# Patient Record
(demographics unavailable — no encounter records)

---

## 2024-10-07 NOTE — PLAN
[FreeTextEntry1] : 49 year old female presents with vulvar itching  -Ucx done -BD affirm done -Rx Terconazole  -Rx Mycolog  RTO 03/25

## 2024-10-07 NOTE — HISTORY OF PRESENT ILLNESS
[FreeTextEntry1] : 49 year old female presents c/o vulvar itching for the past month with mild vaginal discharge. Denies pelvic pain. Pt has Mirena IUD placed 04/03/24.

## 2024-10-07 NOTE — END OF VISIT
[FreeTextEntry3] : I, Concha Esposito, acted as a scribe on behalf of Dr. Zulma Huang on 10/07/2024.  All medical entries made by the scribe were at my, Dr. Zulma Huang, direction and personally dictated by me on 10/07/2024. I have reviewed the chart and agree that the record accurately reflects my personal performance of the history, physical exam, assessment and plan. I have also personally directed, reviewed, and agreed with the chart.

## 2024-10-25 NOTE — PROCEDURE
[de-identified] : Euflexxa # 1 Bilateral Knee Discussed at length with the patient the planned Euflexxa injection. The risks, benefits, convalescence and alternatives were reviewed. The possible side effects discussed included but were not limited to: pain, swelling, heat and redness. There symptoms are generally mild but if they are extensive then contact the office. Giving pain relievers by mouth such as NSAIDs or Tylenol can generally treat the reactions to Euflexxa. Rare cases of infection have been noted. Rash, hives and itching may occur post injection. If you have muscle pain or cramps, flushing and or swelling of the face, rapid heart beat, nausea, dizziness, fever, chills, headache, difficulty breathing, swelling in the arms or legs, or have a prickly feeling of your skin, contact a health care provider immediately.  Following this discussion, the knee was prepped with betadine and under sterile condition the Euflexxa injection was performed with a 22 gauge needle. The needle was introduced into the joint, aspiration was performed to ensure intra-articular placement and the medication was injected. Upon withdrawal of the needle the site was cleaned with alcohol and a bandaid applied. The patient tolerated the injection well and there were no adverse effects. Post injection instructions included no strenuous activity for 24 hours, cryotherapy and if there are any adverse effects to contact the office.

## 2024-10-31 NOTE — ASSESSMENT
[FreeTextEntry1] : #Cysts, R neck, numerous milia-like cysts admixed with inflamed dermal nervus - Counseled about clinically benign lesions  #Eczematous dermatitis - chronic; recurrent flares #Dermatographism - Diagnosis, chronic nature, disease course, treatment options and goals of therapy discussed - Advised avoid necklace as can cause irritant dermatitis vs allergic contact dermatitis to the silver or plated metal - Start OTC allegra or zyrtec daily - Start Tacrolimus 0.1% ointment BID PRN. SED - Will stop topical steroid (triamcinolone, clobetasol) due to potential risks of atrophy with recurrent use on neck  #Neoplasm of uncertain behavior of skin with associated #nail dystrophy  R. 4th toenail at proximal nailfold  - Discussed etiology unspecified, given nail dystrophy with noted growths, recommended biopsy but for highest yield with concern for SCC or NMSC would recommend nail matrix biopsy - Referral to Dr Almaraz  Patient expressed understanding and in agreement with plan  RTC 6 weeks for dermatitis

## 2024-10-31 NOTE — HISTORY OF PRESENT ILLNESS
[FreeTextEntry1] : f/u rash  [de-identified] : Ms. KRISTA DU is a 49 year old F here for evaluation of below   LV w/ Dr. Whittington 7/2024 Problem: right neck rash and inflamed bumps Location: right lateral neck Duration: present for past 6-7 months  Associated symptoms/Aggravating Factors: pruritic Modifying factors/Treatments: trialed clobetasol cream with minimal improvement    Derm Hx: dermatitis as above Personal hx of skin cancer: none FHx of skin cancer: none  Social Hx: works in compliance, works in [place]

## 2024-10-31 NOTE — HISTORY OF PRESENT ILLNESS
[FreeTextEntry1] : f/u rash  [de-identified] : Ms. KRISTA DU is a 49 year old F here for evaluation of below   LV w/ Dr. Whittington 7/2024 Problem: right neck rash and inflamed bumps Location: right lateral neck Duration: present for past 6-7 months  Associated symptoms/Aggravating Factors: pruritic Modifying factors/Treatments: trialed clobetasol cream with minimal improvement    Derm Hx: dermatitis as above Personal hx of skin cancer: none FHx of skin cancer: none  Social Hx: works in compliance, works in [place]

## 2024-10-31 NOTE — PHYSICAL EXAM
[Declined] : declined [FreeTextEntry3] : multiple pink-red papules on the body firm white 1-2 mm papules on the R neck; +dermatographism on R neck brown macule on the leg  right 4th toe with 2 linear exophytic soft growths from proximal nail fold with underlying nail dystrophy

## 2024-11-01 NOTE — PROCEDURE
[de-identified] : Euflexxa # 2 Bilateral knee Discussed at length with the patient the planned Euflexxa injection. The risks, benefits, convalescence and alternatives were reviewed. The possible side effects discussed included but were not limited to: pain, swelling, heat and redness. There symptoms are generally mild but if they are extensive then contact the office. Giving pain relievers by mouth such as NSAIDs or Tylenol can generally treat the reactions to Euflexxa. Rare cases of infection have been noted. Rash, hives and itching may occur post injection. If you have muscle pain or cramps, flushing and or swelling of the face, rapid heart beat, nausea, dizziness, fever, chills, headache, difficulty breathing, swelling in the arms or legs, or have a prickly feeling of your skin, contact a health care provider immediately.  Following this discussion, the knee was prepped with betadine and under sterile condition the Euflexxa injection was performed with a 22 gauge needle. The needle was introduced into the joint, aspiration was performed to ensure intra-articular placement and the medication was injected. Upon withdrawal of the needle the site was cleaned with alcohol and a bandaid applied. The patient tolerated the injection well and there were no adverse effects. Post injection instructions included no strenuous activity for 24 hours, cryotherapy and if there are any adverse effects to contact the office.

## 2024-11-11 NOTE — PROCEDURE
[de-identified] : Euflexxa #3 Bilateral knees Discussed at length with the patient the planned Euflexxa injection. The risks, benefits, convalescence and alternatives were reviewed. The possible side effects discussed included but were not limited to: pain, swelling, heat and redness. There symptoms are generally mild but if they are extensive then contact the office. Giving pain relievers by mouth such as NSAIDs or Tylenol can generally treat the reactions to Euflexxa. Rare cases of infection have been noted. Rash, hives and itching may occur post injection. If you have muscle pain or cramps, flushing and or swelling of the face, rapid heart beat, nausea, dizziness, fever, chills, headache, difficulty breathing, swelling in the arms or legs, or have a prickly feeling of your skin, contact a health care provider immediately.  Following this discussion, the knee was prepped with betadine and under sterile condition the Euflexxa injection was performed with a 22 gauge needle. The needle was introduced into the joint, aspiration was performed to ensure intra-articular placement and the medication was injected. Upon withdrawal of the needle the site was cleaned with alcohol and a bandaid applied. The patient tolerated the injection well and there were no adverse effects. Post injection instructions included no strenuous activity for 24 hours, cryotherapy and if there are any adverse effects to contact the office.

## 2024-11-12 NOTE — HISTORY OF PRESENT ILLNESS
[FreeTextEntry1] : painful lesion on R 4th toenail [de-identified] : 11/12/2024   Referred by: Dr. Coello   We had the pleasure of seeing your patient in consultation for Mohs Micrographic Surgery. Ms. KRISTA DU is a 49 year old F who presents for painful lesion on the R 4th toenail Present since at least this summer Thought it was trauma related initially but continues to persist with a bump at proximal nail fold. More recently started throbbing about 1 week ago She regularly gets pedicures but currently avoiding it No hx of prior warts No hx of genital warts or extensive hand warts Her children had warts as kids She has a trip to [place] 11/16-11/18 weekend She is celebrating her 50th bday with a big trip in December    Pertinent positives noted below History of HIV or hepatitis: No Blood thinners: None Antibiotic Prophylaxis: None Medical implants: None   The patient's review of systems questionnaire was reviewed. Education needs were identified. There were no barriers to learning.

## 2024-11-12 NOTE — PHYSICAL EXAM
[FreeTextEntry3] : R 4th toenail - underlying 4 mm groove running on the entire length of the toenail. Overlying this,there are 2 filliform-like plaques with loops of vessels. Tender to palpation at the cuticle Rest of toenails WNL

## 2024-11-25 NOTE — HISTORY OF PRESENT ILLNESS
[FreeTextEntry1] : painful lesion on R 4th toenail [de-identified] : 11/25/2024   Referred by: Dr. Coello   We had the pleasure of seeing your patient for nail biopsy Ms. KRISTA DU is a 49 year old F who presents for painful lesion on the R 4th toenail. Consult done 11/8/2024 Present since at least this summer, maybe as far back as Feb Thought it was trauma related initially but continues to persist with a bump at proximal nail fold. More recently started throbbing about 1 week ago She regularly gets pedicures but currently avoiding it No hx of prior warts No hx of genital warts or extensive hand warts Her children had warts as kids She had a trip to [place] 11/16-11/18 weekend She is celebrating her 50th bday with a big trip in December Has psoriatic arthritis, on tremfya, sulfasalazine. some scalp irritation otherwise skin is clear. No history of epilepsy  Seen for surgery with her , Dmitry  Pertinent positives noted below History of HIV or hepatitis: No Blood thinners: None Antibiotic Prophylaxis: None Medical implants: None   The patient's review of systems questionnaire was reviewed. Education needs were identified. There were no barriers to learning.

## 2024-11-25 NOTE — PHYSICAL EXAM
[Alert] : alert [Oriented x 3] : ~L oriented x 3 [Well Nourished] : well nourished [Conjunctiva Non-injected] : conjunctiva non-injected [No Visual Lymphadenopathy] : no visual  lymphadenopathy [No Clubbing] : no clubbing [No Edema] : no edema [No Bromhidrosis] : no bromhidrosis [No Chromhidrosis] : no chromhidrosis [FreeTextEntry3] : R 4th toenail - underlying 4 mm groove running on the entire length of the toenail. Overlying this,there are 2 filliform-like plaques with loops of vessels. Tender to palpation at the cuticle Rest of toenails WNL

## 2024-12-02 NOTE — HISTORY OF PRESENT ILLNESS
[FreeTextEntry1] : Fu - wound check [de-identified] : Ms. KRISTA DU  is a 49 year  y/o F  here for  wound check after R 4th toenail avulsion/matrix bx on 11/25/24. Experiencing pain and would like a note to take this week off work.

## 2024-12-02 NOTE — ASSESSMENT
[FreeTextEntry1] : s/p toenail avulsion with biopsy -- Results pending -- Surgical site is clean and healing well -- Discussed that post-op pain is normal and should ed with time - may be worsened given her immunologic history of arthritis as well as recent history of R foot sprain -- f/u with me as needed

## 2024-12-02 NOTE — PHYSICAL EXAM
[Alert] : alert [Oriented x 3] : ~L oriented x 3 [Well Nourished] : well nourished [Conjunctiva Non-injected] : conjunctiva non-injected [No Visual Lymphadenopathy] : no visual  lymphadenopathy [No Clubbing] : no clubbing [No Edema] : no edema [No Bromhidrosis] : no bromhidrosis [No Chromhidrosis] : no chromhidrosis [FreeTextEntry3] : clean and well healing R 4th toenail bed, no expanding erythema, no active bleeding, no purulence.

## 2025-03-11 NOTE — HISTORY OF PRESENT ILLNESS
[FreeTextEntry1] : 49 yo  presents for annual exam. She is doing well and has no complaints. Pt had Mirena IUD placed     OBH: ,  @32 weeks, MAB @6 weeks D+C, c/s @37 weeks GYNH: right ovarian cyst, Mirena IUD placed  PSH: [facility] appendectomy (2021), C/S x1 FH: ovarian CA (MAunt), lung CA (mother), CVA (father), CVD (father), DM (mother) Allergies: Diflucan, Latex   [Mammogramdate] : 1/2025 [PapSmeardate] : 3/2024 [ColonoscopyDate] : 9/22

## 2025-03-11 NOTE — END OF VISIT
[FreeTextEntry3] :    I, Lydia Ventura, acted as a scribe on behalf of Dr.Susan Reina M.D  on 03/11/2025.   All medical entries made by the scribe were at my,  Dr.Susan Reina M.D ,  direction and personally dictated by me on 03/11/2025. I have reviewed the chart and agree that the record accurately reflects my personal performance of the history, physical exam, assessment and plan. I have also personally directed, reviewed, and agreed with the chart.

## 2025-03-11 NOTE — PLAN
[FreeTextEntry1] : 50 year  old female  presents for annual visit  HCM -Pap/hpv done today  -Mammo UTD -Colon UTD   RTO 1yr annual

## 2025-03-11 NOTE — PHYSICAL EXAM
[Appropriately responsive] : appropriately responsive [Alert] : alert [No Acute Distress] : no acute distress [Soft] : soft [Non-distended] : non-distended [Non-tender] : non-tender [No HSM] : No HSM [No Lesions] : no lesions [No Mass] : no mass [Oriented x3] : oriented x3 [Examination Of The Breasts] : a normal appearance [No Masses] : no breast masses were palpable [Labia Majora] : normal [Labia Minora] : normal [IUD String] : an IUD string was noted [Normal] : normal [Uterine Adnexae] : normal

## 2025-05-09 NOTE — ASSESSMENT
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, right thigh Mild surrounding 2) Hyperpigmentation with background 3) Dermatographism - Discussed the r/b/a of biopsy for definitive diagnosis - Patient would like to hold off at this time given prior scars s/p other biopsies in past  - Patient currently on Tremfya, naltrexone, and sulfasalazine - Discussed the r/b/a of ILK; patient amenable to treatment trial with ILK; ILK as below - Recommended holding off on further topical steroids at this time  - START tacrolimus 0.1% ointment BID to affected areas - SED; patient counseled to apply with thin layer of bland unscented Vaseline for first few days if endorsing discomfort with tacrolimus - START OTC Allegra (fexofenadine) 180mg daily; may increase to twice daily as needed for dermatographism - Recommend OTC sunscreen products (SPF 30+, broad band, EltaMD/La Roche Posay/Neutrogena) daily on your face and entire body (apply sunscreen at least 30 minutes prior to going outside). Reapply sunscreen every 2 hours when outside.  Procedure note: Intralesional injection of triamcinolone (ILTAC) Site(s): 1 lesions injected with intralesional triamcinolone 10 mg/cc 0.05 ccs injected total Verbal informed consent obtained. Risks/benefits/alternatives discussed including but not limited to risk of bleeding, hypopigmentation, striae and atrophy as well as possible lack of treatment efficacy and need for repeat treatments. Site confirmed. Area prepped with alcohol. Discussed wound care instructions. Patient tolerated well with no immediate complications. LOT #: [id number] Exp: Jun 2027  #Dermatitis, R lower eyelid - RESOLVED, favor 2/2 contact dermatitis in setting of recently done nails  - Orientation provided about nature of condition, treatment expectations, alternatives, risks and benefits - Counseled regarding avoidance of triggers if as permitted  - START tacrolimus 0.1% ointment BID to affected areas PRN flares - SED; patient counseled to apply with thin layer of bland unscented Vaseline for first few days if endorsing discomfort with tacrolimus - Dry/gentle skin care reviewed.    f/u PRN or if any new or changing concerning lesions/rash

## 2025-05-09 NOTE — HISTORY OF PRESENT ILLNESS
[FreeTextEntry1] : RPV: f/u bump and rash [de-identified] : Ms. KRISTA DU is a 50 year old F with a history of PsA on Tremfya, Naltrexone and Sulfasalazine who presents for:   1. f/u bump on her right upper thigh x weeks, very itchy. Has been using Hydrocortisone for 2 weeks followed by Tacrolimus for the past 2 weeks with little improvement but some noted skin change around the lesion.   2. rash around eye: resolved  H/o eczematous derm and dermatographism.  Notes multiple biopsies outside [facility].

## 2025-05-09 NOTE — PHYSICAL EXAM
[FreeTextEntry3] : Focused skin exam performed  The relevant portions of the exam were performed today  AAOx3, NAD, well-appearing / pleasant Focused examination within normal limits with the exception of:  Solitary pink smooth scarred over papule on the right anterior thigh in a surrounding slightly ecchymotic patch + dermatographism  Eyelids clear; photos reviewed with scaly papule on R medial lower eyelid

## 2025-05-09 NOTE — ASSESSMENT
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, right thigh Mild surrounding 2) Hyperpigmentation with background 3) Dermatographism - Discussed the r/b/a of biopsy for definitive diagnosis - Patient would like to hold off at this time given prior scars s/p other biopsies in past  - Patient currently on Tremfya, naltrexone, and sulfasalazine - Discussed the r/b/a of ILK; patient amenable to treatment trial with ILK; ILK as below - Recommended holding off on further topical steroids at this time  - START tacrolimus 0.1% ointment BID to affected areas - SED; patient counseled to apply with thin layer of bland unscented Vaseline for first few days if endorsing discomfort with tacrolimus - START OTC Allegra (fexofenadine) 180mg daily; may increase to twice daily as needed for dermatographism - Recommend OTC sunscreen products (SPF 30+, broad band, EltaMD/La Roche Posay/Neutrogena) daily on your face and entire body (apply sunscreen at least 30 minutes prior to going outside). Reapply sunscreen every 2 hours when outside.  Procedure note: Intralesional injection of triamcinolone (ILTAC) Site(s): 1 lesions injected with intralesional triamcinolone 10 mg/cc 0.05 ccs injected total Verbal informed consent obtained. Risks/benefits/alternatives discussed including but not limited to risk of bleeding, hypopigmentation, striae and atrophy as well as possible lack of treatment efficacy and need for repeat treatments. Site confirmed. Area prepped with alcohol. Discussed wound care instructions. Patient tolerated well with no immediate complications. LOT #: [id number] Exp: Jun 2027  #Dermatitis, R lower eyelid - RESOLVED, favor 2/2 contact dermatitis in setting of recently done nails  - Orientation provided about nature of condition, treatment expectations, alternatives, risks and benefits - Counseled regarding avoidance of triggers if as permitted  - START tacrolimus 0.1% ointment BID to affected areas PRN flares - SED; patient counseled to apply with thin layer of bland unscented Vaseline for first few days if endorsing discomfort with tacrolimus - Dry/gentle skin care reviewed.    f/u PRN or if any new or changing concerning lesions/rash

## 2025-05-09 NOTE — HISTORY OF PRESENT ILLNESS
[FreeTextEntry1] : RPV: f/u bump and rash [de-identified] : Ms. KRISTA DU is a 50 year old F with a history of PsA on Tremfya, Naltrexone and Sulfasalazine who presents for:   1. f/u bump on her right upper thigh x weeks, very itchy. Has been using Hydrocortisone for 2 weeks followed by Tacrolimus for the past 2 weeks with little improvement but some noted skin change around the lesion.   2. rash around eye: resolved  H/o eczematous derm and dermatographism.  Notes multiple biopsies outside [facility].

## 2025-06-06 NOTE — HISTORY OF PRESENT ILLNESS
[de-identified] :  KRISTA DU 50 year old female presents for follow up evaluation of BL knee symptoms related to psoriatic arthritis. She is under care of her Rheumatologist. She states she takes Sulfa medications for the arthritis as per her rheumatologist. She exercises on her own at home as well as yoga. She has lost 15 pounds over the last few months and states that it has helped her knee symptoms. She responded well to Euflexxa injections done last year in November. She would like to repeat these injections.

## 2025-06-06 NOTE — DISCUSSION/SUMMARY
[de-identified] : Discussed at length the nature of the patient's condition. Their BL knee symptoms appear secondary to severe degenerative arthritis. She will continue with a non-operative approach. She will schedule her gel injections for the upcoming weeks. She will also continue the medication she was given from her rheumatologist. She will continue with her home exercises.  Patient can continue home exercises, Tylenol, weight management and activities as tolerated. All questions were answered, understanding verbalized. Patient is in agreement with plan of treatment. Patient may follow up as needed.

## 2025-06-06 NOTE — ADDENDUM
[FreeTextEntry1] : This note was written by Jesús Garcia on 06/06/2025 acting as scribe for Dr. Gray Cabral M.D.  I, Dr. Gray Cabral, have read and attest that all the information, medical decision making and discharge instructions within are true and accurate.

## 2025-06-06 NOTE — PHYSICAL EXAM
[de-identified] :  General appearance: well nourished and hydrated, pleasant, alert and oriented x 3, cooperative. HEENT: Normocephalic, EOM intact, Nasal septum midline, Oral cavity clear, External auditory canal clear. Cardiovascular: no apparent abnormalities, no lower leg edema, no varicosities, pedal pulses are palpable. Lymphatics Lymph nodes: none palpated, Lymphedema: not present. Neurologic: sensation is normal, no muscle weakness in upper or lower extremities, patella tendon reflexes intact . Dermatologic no apparent skin lesions, moist, warm, no rash. Spine:cervical spine appears normal and moves freely, thoracic spine appears normal and moves freely, lumbosacral spine appears normal and moves freely. Gait: nonantalgic.   Left knee Inspection: trace effusion. Wounds: none. Alignment: normal. Palpation: peripatellar tenderness on palpation. ROM active (in degrees): 0-125 with pain through the arc of motion  Ligamentous laxity: all ligaments appear stable,, negative ant. drawer test, negative post. drawer test, stable to varus stress test, stable to valgus stress test. negative Lachman's test, negative pivot shift test Meniscal Test: negative McMurrays, negative Oziel. Patellofemoral Alignment Test: Q angle-, normal. Muscle Test: good quad strength. Leg examination: calf is soft and non-tender.   Right knee Inspection: trace effusion. Wounds: none. Alignment: normal. Palpation: peripatellar tenderness on palpation. ROM active (in degrees): 0-125 with pain through the arc of motion  Ligamentous laxity: all ligaments appear stable,, negative ant. drawer test, negative post. drawer test, stable to varus stress test, stable to valgus stress test. negative Lachman's test, negative pivot shift test Meniscal Test: negative McMurrays, negative Oziel. Patellofemoral Alignment Test: Q angle-, normal. Muscle Test: good quad strength. Leg examination: calf is soft and non-tender. 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   Left knee x-rays, standing AP/Lateral and Merchant films, and 45-degree PA standing view, taken at the office today shows diffuse tricompartmental degenerative arthritis, femorotibial space maintained, slight lateral tracking with lateral joint space narrowing, medial joint space narrowing, marginal osteophytes, sclerosis, patellofemoral joint space narrowing, peripheral osteophytes, Kellgren and Solomon grade 1 with patellofemoral arthritis.

## 2025-06-06 NOTE — CONSULT LETTER
[Dear  ___] : Dear  [unfilled], [Consult Letter:] : I had the pleasure of evaluating your patient, [unfilled]. [Please see my note below.] : Please see my note below. [Consult Closing:] : Thank you very much for allowing me to participate in the care of this patient.  If you have any questions, please do not hesitate to contact me. [Sincerely,] : Sincerely, [FreeTextEntry2] : ALEYDA ALEMAN